# Patient Record
Sex: MALE | NOT HISPANIC OR LATINO
[De-identification: names, ages, dates, MRNs, and addresses within clinical notes are randomized per-mention and may not be internally consistent; named-entity substitution may affect disease eponyms.]

---

## 2017-08-23 ENCOUNTER — RECORDS - HEALTHEAST (OUTPATIENT)
Dept: ADMINISTRATIVE | Facility: OTHER | Age: 72
End: 2017-08-23

## 2017-08-23 ENCOUNTER — AMBULATORY - HEALTHEAST (OUTPATIENT)
Dept: NEUROSURGERY | Facility: CLINIC | Age: 72
End: 2017-08-23

## 2017-08-24 ENCOUNTER — RECORDS - HEALTHEAST (OUTPATIENT)
Dept: RADIOLOGY | Facility: CLINIC | Age: 72
End: 2017-08-24

## 2017-08-28 ENCOUNTER — OFFICE VISIT - HEALTHEAST (OUTPATIENT)
Dept: NEUROSURGERY | Facility: CLINIC | Age: 72
End: 2017-08-28

## 2017-08-28 DIAGNOSIS — Z41.9 SURGERY, ELECTIVE: ICD-10-CM

## 2017-08-28 ASSESSMENT — MIFFLIN-ST. JEOR: SCORE: 1349.07

## 2017-08-30 ENCOUNTER — COMMUNICATION - HEALTHEAST (OUTPATIENT)
Dept: NEUROSURGERY | Facility: CLINIC | Age: 72
End: 2017-08-30

## 2017-08-31 ENCOUNTER — RECORDS - HEALTHEAST (OUTPATIENT)
Dept: ADMINISTRATIVE | Facility: OTHER | Age: 72
End: 2017-08-31

## 2017-08-31 ENCOUNTER — AMBULATORY - HEALTHEAST (OUTPATIENT)
Dept: NEUROSURGERY | Facility: CLINIC | Age: 72
End: 2017-08-31

## 2017-08-31 ENCOUNTER — AMBULATORY - HEALTHEAST (OUTPATIENT)
Dept: LAB | Facility: CLINIC | Age: 72
End: 2017-08-31

## 2017-08-31 ENCOUNTER — RECORDS - HEALTHEAST (OUTPATIENT)
Dept: RADIOLOGY | Facility: CLINIC | Age: 72
End: 2017-08-31

## 2017-08-31 DIAGNOSIS — Z01.818 PRE-OP EVALUATION: ICD-10-CM

## 2017-08-31 DIAGNOSIS — Z41.9 SURGERY, ELECTIVE: ICD-10-CM

## 2017-09-01 ENCOUNTER — OFFICE VISIT - HEALTHEAST (OUTPATIENT)
Dept: NEUROSURGERY | Facility: CLINIC | Age: 72
End: 2017-09-01

## 2017-09-01 DIAGNOSIS — Z01.818 PRE-OP EVALUATION: ICD-10-CM

## 2017-09-04 ENCOUNTER — ANESTHESIA - HEALTHEAST (OUTPATIENT)
Dept: SURGERY | Facility: CLINIC | Age: 72
End: 2017-09-04

## 2017-09-05 ENCOUNTER — SURGERY - HEALTHEAST (OUTPATIENT)
Dept: SURGERY | Facility: CLINIC | Age: 72
End: 2017-09-05

## 2017-09-05 ASSESSMENT — MIFFLIN-ST. JEOR
SCORE: 1336.03
SCORE: 1336.37

## 2017-09-06 ENCOUNTER — COMMUNICATION - HEALTHEAST (OUTPATIENT)
Dept: NEUROSURGERY | Facility: CLINIC | Age: 72
End: 2017-09-06

## 2017-09-18 ENCOUNTER — AMBULATORY - HEALTHEAST (OUTPATIENT)
Dept: NEUROSURGERY | Facility: CLINIC | Age: 72
End: 2017-09-18

## 2017-09-18 DIAGNOSIS — Z51.89 VISIT FOR WOUND CHECK: ICD-10-CM

## 2017-09-25 ENCOUNTER — AMBULATORY - HEALTHEAST (OUTPATIENT)
Dept: NEUROSURGERY | Facility: CLINIC | Age: 72
End: 2017-09-25

## 2017-09-25 DIAGNOSIS — Z48.02 REMOVAL OF STAPLES: ICD-10-CM

## 2017-10-16 ENCOUNTER — OFFICE VISIT - HEALTHEAST (OUTPATIENT)
Dept: NEUROSURGERY | Facility: CLINIC | Age: 72
End: 2017-10-16

## 2017-10-16 DIAGNOSIS — M51.26 LUMBAR HERNIATED DISC: ICD-10-CM

## 2021-05-25 ENCOUNTER — RECORDS - HEALTHEAST (OUTPATIENT)
Dept: ADMINISTRATIVE | Facility: CLINIC | Age: 76
End: 2021-05-25

## 2021-05-26 ENCOUNTER — RECORDS - HEALTHEAST (OUTPATIENT)
Dept: ADMINISTRATIVE | Facility: CLINIC | Age: 76
End: 2021-05-26

## 2021-05-27 ENCOUNTER — RECORDS - HEALTHEAST (OUTPATIENT)
Dept: ADMINISTRATIVE | Facility: CLINIC | Age: 76
End: 2021-05-27

## 2021-05-28 ENCOUNTER — RECORDS - HEALTHEAST (OUTPATIENT)
Dept: ADMINISTRATIVE | Facility: CLINIC | Age: 76
End: 2021-05-28

## 2021-05-31 ENCOUNTER — RECORDS - HEALTHEAST (OUTPATIENT)
Dept: ADMINISTRATIVE | Facility: CLINIC | Age: 76
End: 2021-05-31

## 2021-05-31 VITALS — WEIGHT: 141 LBS | HEIGHT: 68 IN | BODY MASS INDEX: 21.37 KG/M2

## 2021-05-31 VITALS — BODY MASS INDEX: 20.95 KG/M2 | HEIGHT: 68 IN | WEIGHT: 138.2 LBS

## 2021-06-12 NOTE — ANESTHESIA POSTPROCEDURE EVALUATION
Patient: Bernardo Salinas   LUMBAR LAMINECTOMY L3-4  Anesthesia type: general    Patient location: PACU  Last vitals:   Vitals:    09/05/17 1045   BP: 131/78   Pulse: 60   Resp: 20   Temp:    SpO2: 99%     Post vital signs: stable  Level of consciousness: awake and responds to simple questions  Post-anesthesia pain: pain controlled  Post-anesthesia nausea and vomiting: no  Pulmonary: unassisted, return to baseline, spontaneous ventilation, room air  Cardiovascular: stable and blood pressure at baseline  Hydration: adequate  Anesthetic events: no    QCDR Measures:  ASA# 11 - Effie-op Cardiac Arrest: ASA11B - Patient did NOT experience unanticipated cardiac arrest  ASA# 12 - Effie-op Mortality Rate: ASA12B - Patient did NOT die  ASA# 13 - PACU Re-Intubation Rate: ASA13B - Patient did NOT require a new airway mgmt  ASA# 10 - Composite Anes Safety: ASA10A - No serious adverse event    Additional Notes:

## 2021-06-12 NOTE — PROGRESS NOTES
Preop Assessment: Bernardo Salinas presents for pre-op review.  Surgeon: Dr. Cuevas  Name of Surgery: Right lumbar laminectomy L3-4  Diagnosis: disc herniation at L3-4 on the right  Date of Surgery: 09/05/2017  Time of Surgery: 0730  Hospital: [facility]  H&P: by Dr. Cline on 8/31/2017 - in PACS  History of ASA, NSAIDS, vitamin and/or herbal supplements within 10 days: No  History of blood thinners: No  History of anti-seizure med's: No  Review of systems: unchanged    Diagnostics:  Labs: WNL  CXR: clear chest  EKG: NSR  Other: CT from 8/10/2017 in PACS  Films: MRI from 7/8/2017 - in PACS    All questions answered regarding surgery and expected pre and postoperative course including rehabilitation phase.     Reviewed with patient: Arrive 2.5 -3 hours prior to scheduled surgery, nothing to eat or drink after midnight the night before surgery and bring all pertinent films to the hospital the day of surgery.  Continue to refrain from NSAIDS (Ibuprofen, Aleve, Naprosyn) ASA or over the counter herbal medication or supplements, anticoagulants and blood thinners.    Preop skin preparations and instructions provided.      Rita Plummer RN, CNRN

## 2021-06-12 NOTE — PROGRESS NOTES
This is a level 2 office consult.    Bernardo Salinas  is a 72 y.o. male     Chief complaint: Right leg pain    HPI: The patient also has a right foot drop which has been present since March 2017.  He has trouble walking or standing.  The right leg pain goes into the top of the right foot.  He also has numbness over the top of the right foot.  He tried an injection which made him worse.  Some left leg pain which is not an issue currently.    PMH/ROS: Good health.  No heart disease, lung disease, cancer, diabetes.  4 previous spine surgeries.  3 out of the 4 were done by me.  He has had surgery at L4-5 on the right with a redo at L4-5 on the right plus C6-7 on the left.  He had a laminectomy at L3-4 on the left by Dr. Ro.    Exam: Well-developed and well-nourished.  Thin.  BMI 21.  Limps on right leg.  Walks with a foot drop.  Has trouble walking on his right heel.  Good range of motion.  Reflexes present.  Weakness right ankle dorsiflexion.  No atrophy.  Sensation decreased top of the right foot.  Straight leg raising negative.    Studies/imaging: MRI shows a herniated disc at L3-4 on the right.  There was a CT done after the MRI and we need those pictures.    Impression: Herniated disc L3-4 on the right.    Plan: Lumbar laminectomy L3-4 on the right.  I showed the MRI pictures to the patient and his wife.  We talked about the nature of the problem, nature of the surgery, rationale for doing it, goals, benefits, alternatives, and significant risks and complications.  I answered all their questions.  They said they were satisfied with the explanation and understood.  They requested surgery.  They gave informed consent to go ahead with surgery.  We are working on scheduling.  The patient and his wife are satisfied with the plan.

## 2021-06-12 NOTE — ANESTHESIA PREPROCEDURE EVALUATION
Anesthesia Evaluation      Patient summary reviewed   No history of anesthetic complications     Airway   Mallampati: I  Neck ROM: full   Pulmonary - normal exam    breath sounds clear to auscultation  (+) a smoker (remote history (quit 40 yrs ago))  (-) COPD, asthma, sleep apnea                         Cardiovascular - normal exam  Exercise tolerance: > or = 4 METS  (+) , hypercholesterolemia,     (-) hypertension, past MI, CAD, CABG/stent, angina, murmur  ECG reviewed  Rhythm: regular  Rate: normal,    no murmur      Neuro/Psych      Comments: Numbness down lateral RLE and dorsum of foot. Gets paresthesias in same distribution. Pt denies motor weakness in RLE.  H/o prior surgeries to L3-4. H/o T-spine surgery. No limitation of neck ROM.    Endo/Other       Comments: Raynaud's - happens when hands get cold. Denies h/o ischemic damage to digits. Denies h/o RA or other rheumatic dz.    GI/Hepatic/Renal    (-) GERD, impaired hepatic function, renal disease          Dental      Comment: Missing upper L molar/premolar                       Anesthesia Plan  Planned anesthetic: general endotracheal  Pt requests gentle intubation to have less sore throat postop.  ASA 2   Induction: intravenous   Anesthetic plan and risks discussed with: patient  Anesthesia plan special considerations: video-assisted,   Post-op plan: routine recovery

## 2021-06-12 NOTE — PROGRESS NOTES
Neurosurgery consultation was requested by: Self referral   Pain: Low back pain .   Radicular Pain is present: Radiates into groin and buttock down left left the knee and down right leg to the foot   Lhermitte sign: No  Motor complaints: Weakness in right leg   Sensory complaints: Some numbness in left thigh   Gait and balance issues: Yes  Bowel or bladder issues:  Pt has a history of bladder issues due to enlarged prostate.   Duration of SX is:3/2017  The symptoms are worse with: Everything   The symptoms are better with: nothing   Injury: Not sure   Severity is: Mid - moderate  Patient has tried the following conservative measures: Pt had a injection and made symptoms worse.   ARPIT score is: 40%  NAYELI Woodson

## 2021-06-12 NOTE — ANESTHESIA CARE TRANSFER NOTE
Last vitals:   Vitals:    09/05/17 1008   BP: 131/63   Pulse: 67   Resp: 14   Temp: 36.6  C (97.9  F)   SpO2: 97%     Patient's level of consciousness is drowsy  Spontaneous respirations: yes  Maintains airway independently: yes  Dentition unchanged: yes  Oropharynx: oropharynx clear of all foreign objects    QCDR Measures:  ASA# 20 - Surgical Safety Checklist: WHO surgical safety checklist completed prior to induction  PQRS# 430 - Adult PONV Prevention: 4558F - Pt received => 2 anti-emetic agents (different classes) preop & intraop  ASA# 8 - Peds PONV Prevention: NA - Not pediatric patient, not GA or 2 or more risk factors NOT present  PQRS# 424 - Effie-op Temp Management: 4559F - At least one body temp DOCUMENTED => 35.5C or 95.9F within required timeframe  PQRS# 426 - PACU Transfer Protocol: - Transfer of care checklist used  ASA# 14 - Acute Post-op Pain: ASA14B - Patient did NOT experience pain >= 7 out of 10

## 2021-06-13 NOTE — PROGRESS NOTES
CHART NOTE     DATE OF SERVICE:  10/16/2017     : 1945   72 y.o.     ASSESSMENT :   Doing well with improvement in symptoms and function.       PLAN:  Wean from collar/brace. Loosen restrictions. Refer to PT. RTC prn. Enc to call with any new questions or concerns.     HPI:  Bernardo Salinas is status post LUMBAR LAMINECTOMY L3-4 on 17 by Dr. Cuevas. Initially presented with pain and muscle spasms in low back that radiated down both legs R>L to his feet.     TODAY, Bernardo Salinas reports back pain is much improved,  Leg pain also improving  (now only 2-3/10). Walking better. Even left leg is better where he had previous surgery.  Does not feel the need for PT-this is his 5th back surgery (Dr. Cuevas has done 4).      PAST MEDICAL HISTORY, SURGICAL HISTORY, REVIEW OF SYMPTOMS, MEDICATIONS AND ALLERGIES:  Past medical history, surgical history, ROS, medications and allergies reviewed with patient and remain unchanged from previous visit.    Past Medical History:   Diagnosis Date     Anxiety      Back pain      Claustrophobia      Diverticulitis      Hyperlipidemia      Hypertrophy of prostate      Raynaud's phenomenon      Sinusitis, chronic      PHYSICAL EXAM:      Neurological exam reveals:  Respirations easy, non-labored.   Skin: W/D/I. No rashes, lesions or breaks in integrity.   Recent and remote memory intact, fund of knowledge wnl.    Alert and oriented x3, speech fluent and appropriate.   PERRL, EOMI, No nystagmus,   Face symmetric, tongue midline, Uvula midline,  palate rises with phonation   Shoulder shrug equal   Leg strength bilateral dorsiflexion, plantar flexion, and hip flexion 5/5  No extremity edema noted.   Can heel/toe walk, do toe rises and squats without difficulty.   Muscle Bulk and tone wnl.   Reflexes: No pathological reflexes   Gait and station:Normal  Incision: CDI without erythema or edema  NDI/ARPIT: 0%     RADIOGRAPHIC IMAGING: NA

## 2021-06-13 NOTE — PROGRESS NOTES
"Pt is here for a wound check s/p LUMBAR LAMINECTOMY L3-4 on 9-5-17 by Dr. Cuevas.   Before surgery he states he had pain and muscle spasms in low back that radiated down both legs R>L to his feet.  Today he states he still has the leg pain but  to a lesser degree.  The \"driving pressure\" shooting down his legs is improved.   He is taking no meds for pain or muscle spasm.     Surgical wound WNL, staples intact- CDI, no signs of infection or skin breakdown.  Incision healing well: good skin approximation, no redness or visible/palpable edema, no tenderness to palpation.  PT. AF, denies fever, chills or sweats.  Pt. reports that the symptoms are somewhat improved from pre-op.    Kathia Andrew RN  "

## 2021-06-13 NOTE — PROGRESS NOTES
"Pt is here for staple removal s/p LUMBAR LAMINECTOMY L3-4 on 9-5-17 by Dr. Cuevas.   Before surgery he states he had pain and muscle spasms in low back that radiated down both legs R>L to his feet.  Today he states he still has the leg pain but  to a lesser degree.  The \"driving pressure\" shooting down his legs is improved.   He is taking no meds for pain or muscle spasm.     Surgical wound WNL - CDI, no signs of infection or skin breakdown.  Incision well-healed: good skin approximation, no redness or visible/palpable edema, no tenderness to palpation.  PT. AF, denies fever, chills or sweats.  Pt. reports that the symptoms are improved from pre-op.    Staples - intact removed without difficulty. Wound prepped with Betadine before and after removal.  Surrounding skin has no signs of breakdown.  Verbal instructions regarding incision care are given.  Pt. advised to call us if any s/s of infection noted - all discussed in detail.      Kathia Andrew RN  "